# Patient Record
Sex: MALE | Race: WHITE | Employment: STUDENT | URBAN - METROPOLITAN AREA
[De-identification: names, ages, dates, MRNs, and addresses within clinical notes are randomized per-mention and may not be internally consistent; named-entity substitution may affect disease eponyms.]

---
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
## 2020-02-10 ENCOUNTER — HEALTH MAINTENANCE LETTER (OUTPATIENT)
Age: 20
End: 2020-02-10

## 2020-03-26 ENCOUNTER — MYC REFILL (OUTPATIENT)
Dept: FAMILY MEDICINE | Facility: CLINIC | Age: 20
End: 2020-03-26

## 2020-03-26 DIAGNOSIS — F41.1 GAD (GENERALIZED ANXIETY DISORDER): ICD-10-CM

## 2020-03-26 DIAGNOSIS — F32.1 MODERATE MAJOR DEPRESSION (H): ICD-10-CM

## 2020-03-27 NOTE — TELEPHONE ENCOUNTER
"Requested Prescriptions   Pending Prescriptions Disp Refills     FLUoxetine (PROZAC) 20 MG capsule 90 capsule 1     Sig: Take 1 capsule (20 mg) by mouth daily       SSRIs Protocol Failed - 3/26/2020  6:21 PM        Failed - PHQ-9 score less than 5 in past 6 months     Please review last PHQ-9 score.           Passed - Medication is active on med list        Passed - Patient is age 18 or older        Passed - Recent (6 mo) or future (30 days) visit within the authorizing provider's specialty     Patient had office visit in the last 6 months or has a visit in the next 30 days with authorizing provider or within the authorizing provider's specialty.  See \"Patient Info\" tab in inbasket, or \"Choose Columns\" in Meds & Orders section of the refill encounter.               Last Written Prescription Date:  11/29/19  Last Fill Quantity: 90,   # refills: 1  Last Office Visit: 11/29/19  Future Office visit:       Per review of medication, patient received 90 tablets on 11/29/19 with one refill. Patient should not need a refill until 5/29/20. Left vm to verify with patient if he needs refill and if he is taking medication correctly.    Micki Sosa RN   [facility]   "

## 2020-03-30 NOTE — TELEPHONE ENCOUNTER
PHQ-9 sent to patient through my chart to fill out.    Micki Sosa RN   [facility]

## 2020-04-07 ENCOUNTER — VIRTUAL VISIT (OUTPATIENT)
Dept: FAMILY MEDICINE | Facility: CLINIC | Age: 20
End: 2020-04-07
Payer: COMMERCIAL

## 2020-04-07 DIAGNOSIS — F32.1 MODERATE MAJOR DEPRESSION (H): ICD-10-CM

## 2020-04-07 DIAGNOSIS — F41.1 GAD (GENERALIZED ANXIETY DISORDER): Primary | ICD-10-CM

## 2020-04-07 PROCEDURE — 99214 OFFICE O/P EST MOD 30 MIN: CPT | Mod: 95 | Performed by: FAMILY MEDICINE

## 2020-04-07 NOTE — PROGRESS NOTES
"Subjective     Cole Pearce is a 19 year old male who is being evaluated via a billable telephone visit.      The patient has been notified of following:     \"This telephone visit will be conducted via a call between you and your physician/provider. We have found that certain health care needs can be provided without the need for a physical exam.  This service lets us provide the care you need with a short phone conversation.  If a prescription is necessary we can send it directly to your pharmacy.  If lab work is needed we can place an order for that and you can then stop by our lab to have the test done at a later time.    If during the course of the call the physician/provider feels a telephone visit is not appropriate, you will not be charged for this service.\"     Patient has given verbal consent for Telephone visit?  Yes    Cole Pearce complains of   Chief Complaint   Patient presents with     Depression     HUGO       ALLERGIES  Zithromax [azithromycin dihydrate]    Depression and Anxiety Follow-Up    How are you doing with your depression since your last visit? Improved somewhat    How are you doing with your anxiety since your last visit?  Improved some    Are you having other symptoms that might be associated with depression or anxiety? No    Have you had a significant life event? No     Do you have any concerns with your use of alcohol or other drugs? No    Social History     Tobacco Use     Smoking status: Never Smoker     Smokeless tobacco: Never Used   Substance Use Topics     Alcohol use: No     Drug use: No     PHQ 11/29/2019 11/29/2019 4/4/2020   PHQ-9 Total Score 19 19 17   Q9: Thoughts of better off dead/self-harm past 2 weeks Not at all Not at all Not at all     HUGO-7 SCORE 11/29/2019 11/29/2019   Total Score 17 17           Suicide Assessment Five-step Evaluation and Treatment (SAFE-T)      How many servings of fruits and vegetables do you eat daily?  2-3    On average, how many " sweetened beverages do you drink each day (Examples: soda, juice, sweet tea, etc.  Do NOT count diet or artificially sweetened beverages)?   0    How many days per week do you exercise enough to make your heart beat faster? 3 or less    How many minutes a day do you exercise enough to make your heart beat faster? 10 - 19    How many days per week do you miss taking your medication? 0         Patient Active Problem List   Diagnosis     Situational anxiety     Past Surgical History:   Procedure Laterality Date     CYSTECTOMY PILONIDAL N/A 7/6/2018    Procedure: CYSTECTOMY PILONIDAL;  EXCISION OF PILONIDAL CYST ;  Surgeon: Jasen Cesar MD;  Location: [facility]     GI SURGERY       LAPAROSCOPIC APPENDECTOMY N/A 11/1/2017    Procedure: LAPAROSCOPIC APPENDECTOMY;  LAPAROSCOPIC APPENDECTOMY;  Surgeon: Georgina Schafer MD;  Location:  OR       Social History     Tobacco Use     Smoking status: Never Smoker     Smokeless tobacco: Never Used   Substance Use Topics     Alcohol use: No     Family History   Problem Relation Age of Onset     Asthma Mother      Allergies Mother      Obesity Mother      Thyroid Disease Mother            Reviewed and updated as needed this visit by Provider         Review of Systems   ROS COMP: Constitutional, HEENT, cardiovascular, pulmonary, gi and gu systems are negative, except as otherwise noted.         Assessment/Plan:  1. HUGO (generalized anxiety disorder)  better  increase  - FLUoxetine (PROZAC) 20 MG capsule; Take 2 capsules (40 mg) by mouth daily  Dispense: 180 capsule; Refill: 1  Walk daily  2. Moderate major depression (H)  As above  - FLUoxetine (PROZAC) 20 MG capsule; Take 2 capsules (40 mg) by mouth daily  Dispense: 180 capsule; Refill: 1   Follow up in 1 month.     Phone call duration:  15 minutes    Amrik Armenta MD

## 2020-04-07 NOTE — TELEPHONE ENCOUNTER
Dr Armenta    Pt did the PHQ9 score was 17 better than the last 2 times,   Do you want to do a phone visit with him  Last OV was 11/29/19     Krissy Grubbs RN   [facility]

## 2020-04-29 ENCOUNTER — MYC REFILL (OUTPATIENT)
Dept: FAMILY MEDICINE | Facility: CLINIC | Age: 20
End: 2020-04-29

## 2020-04-29 DIAGNOSIS — F32.1 MODERATE MAJOR DEPRESSION (H): ICD-10-CM

## 2020-04-29 DIAGNOSIS — F41.1 GAD (GENERALIZED ANXIETY DISORDER): ICD-10-CM

## 2020-07-04 ENCOUNTER — OFFICE VISIT (OUTPATIENT)
Dept: URGENT CARE | Facility: URGENT CARE | Age: 20
End: 2020-07-04
Payer: COMMERCIAL

## 2020-07-04 VITALS
WEIGHT: 315 LBS | RESPIRATION RATE: 18 BRPM | TEMPERATURE: 98.6 F | OXYGEN SATURATION: 98 % | HEART RATE: 84 BPM | SYSTOLIC BLOOD PRESSURE: 138 MMHG | DIASTOLIC BLOOD PRESSURE: 90 MMHG | BODY MASS INDEX: 47.97 KG/M2

## 2020-07-04 DIAGNOSIS — H60.391 INFECTIVE OTITIS EXTERNA, RIGHT: Primary | ICD-10-CM

## 2020-07-04 PROCEDURE — 99213 OFFICE O/P EST LOW 20 MIN: CPT | Performed by: PHYSICIAN ASSISTANT

## 2020-07-04 RX ORDER — NEOMYCIN SULFATE, POLYMYXIN B SULFATE AND HYDROCORTISONE 10; 3.5; 1 MG/ML; MG/ML; [USP'U]/ML
3 SUSPENSION/ DROPS AURICULAR (OTIC) 4 TIMES DAILY
Qty: 5 ML | Refills: 0 | Status: SHIPPED | OUTPATIENT
Start: 2020-07-04 | End: 2020-07-11

## 2020-07-04 NOTE — PATIENT INSTRUCTIONS
Patient Education     External Ear Infection (Adult)    External otitis (also called  swimmer s ear ) is an infection in the ear canal. It is often caused by bacteria or fungus. It can occur a few days after water gets trapped in the ear canal (from swimming or bathing). It can also occur after cleaning too deeply in the ear canal with a cotton swab or other object. Sometimes, hair care products get into the ear canal and cause this problem.  Symptoms can include pain, fever, itching, redness, drainage, or swelling of the ear canal. Temporary hearing loss may also occur.  Home care    Do not try to clean the ear canal. This can push pus and bacteria deeper into the canal.    Use prescribed ear drops as directed. These help reduce swelling and fight the infection. If an ear wick was placed in the ear canal, apply drops right onto the end of the wick. The wick will draw the medicine into the ear canal even if it is swollen closed.    A cotton ball may be loosely placed in the outer ear to absorb any drainage.    You may use acetaminophen or ibuprofen to control pain, unless another medicine was prescribed. Note: If you have chronic liver or kidney disease or ever had a stomach ulcer or GI bleeding, talk to your healthcare provider before taking any of these medicines.    Do not allow water to get into your ear when bathing. Also, don't swim until the infection has cleared.  Prevention    Keep your ears dry. This helps lower the risk of infection. Dry your ears with a towel or hair dryer after getting wet. Also, use ear plugs when swimming.    Do not stick any objects in the ear to remove wax.    If you feel water trapped in your ear, use ear drops right away. You can get these drops over the counter at most drugstores. They work by removing water from the ear canal.  Follow-up care  Follow up with your healthcare provider in 1 week, or as advised.  When to seek medical advice  Call your healthcare provider right  away if any of these occur:    Ear pain becomes worse or doesn t improve after 3 days of treatment    Redness or swelling of the outer ear occurs or gets worse    Headache    Painful or stiff neck    Drowsiness or confusion    Fever of 100.4 F (38 C) or higher, or as directed by your healthcare provider    Seizure  Date Last Reviewed: 10/1/2017    6280-0710 [employer]. [address] [place] [address]. All rights reserved. This information is not intended as a substitute for professional medical care. Always follow your healthcare professional's instructions.

## 2020-07-04 NOTE — PROGRESS NOTES
"      SUBJECTIVE:  Cole Pearce presents to  today for evaluation of  Right ear pain and pain around right ear sometime \"shooting to my right jaw\" for 2 days duration. Of note, patient has been using ear buds.       ROS:     HEENT: Positive nasal congestion   RESP: Positive mild cough Despite cough, denies any severe SOB.  Denies any hx of asthma or RAD.   GI: Denies any N/V/D. No abdominal pain. Normal BM's  SKIN: Denies rash  NEURO:  Parent denies any lethargy.   URINARY: Reports good PO fluid intake and normal UOP.  Denies any dysuria or UTI sxs.       Past Medical History:   Diagnosis Date     Obese      Pilonidal cyst        Current Outpatient Medications   Medication     FLUoxetine (PROZAC) 20 MG capsule     No current facility-administered medications for this visit.        Allergies   Allergen Reactions     Zithromax [Azithromycin Dihydrate] Rash     Full body           OBJECTIVE:  BP (!) 138/90 (BP Location: Right arm, Patient Position: Chair, Cuff Size: Adult Large)   Pulse 84   Temp 98.6  F (37  C) (Tympanic)   Resp 18   Wt 143.1 kg (315 lb 8 oz)   SpO2 98%   BMI 47.97 kg/m          GENERAL:  Very pleasant, comfortable and generally well appearing.  Skin: Negative for any rashes and o/w normal. Well hydrated. Skin pink.  HEENT:   Conjunctiva without infection.  Sclera clear.  Nasal mucosa is congested   Oropharyngeal exam is normal: no lesions, erythema, adenopathy or exudate.    HEENT:   Conjunctiva without infection.  Sclera clear.  Left external canal is clear. eft TM is normal: no effusions, no erythema, and normal landmarks.  Right external canal is mild to moderately erythematous. Small amount of cerumen and purulent debris. Tragus is tender on manipulation. Right TM is normal in color: no effusions, no erythema, and normal landmarks. No tasha-auricular redness or swelling. No facial or neck swelling. No mastoid redness, heat, pain or swelling.   Nasal mucosa is normal.  Oropharyngeal " "exam is normal: no lesions, erythema, adenopathy or exudate.  NECK:  Supple. FROM. No lateral neck swelling. No adenopathy.  NEURO: Alert and oriented.  Normal speech and mentation.  CN II/XII grossly intact.  Gait within normal limits.        ASSESSMENT/PLAN:    (H60.669) Infective otitis externa, right  (primary encounter diagnosis)  Plan: neomycin-polymyxin-hydrocortisone (CORTISPORIN)        [id number] otic suspension    1. Abx gtts as per above   2. Follow-up with PCP if sxs change, worsen or fail to fully resolve with above tx.  3.  In addition to the above, OE  \"red flag\" signs and sxs are reviewed with pt both verbally and by way of printed educational material for home review.  Pt verbalizes understanding of and agrees to the above plan.                 "

## 2020-07-06 ENCOUNTER — MYC MEDICAL ADVICE (OUTPATIENT)
Dept: FAMILY MEDICINE | Facility: CLINIC | Age: 20
End: 2020-07-06

## 2020-07-06 ENCOUNTER — OFFICE VISIT (OUTPATIENT)
Dept: URGENT CARE | Facility: URGENT CARE | Age: 20
End: 2020-07-06
Payer: COMMERCIAL

## 2020-07-06 VITALS
TEMPERATURE: 98.4 F | HEART RATE: 93 BPM | OXYGEN SATURATION: 98 % | SYSTOLIC BLOOD PRESSURE: 132 MMHG | DIASTOLIC BLOOD PRESSURE: 80 MMHG

## 2020-07-06 DIAGNOSIS — H66.001 NON-RECURRENT ACUTE SUPPURATIVE OTITIS MEDIA OF RIGHT EAR WITHOUT SPONTANEOUS RUPTURE OF TYMPANIC MEMBRANE: Primary | ICD-10-CM

## 2020-07-06 PROCEDURE — 99213 OFFICE O/P EST LOW 20 MIN: CPT | Performed by: FAMILY MEDICINE

## 2020-07-06 RX ORDER — AMOXICILLIN 875 MG
875 TABLET ORAL 2 TIMES DAILY
Qty: 14 TABLET | Refills: 0 | Status: SHIPPED | OUTPATIENT
Start: 2020-07-06 | End: 2020-07-13

## 2020-07-06 ASSESSMENT — ENCOUNTER SYMPTOMS
COUGH: 0
SHORTNESS OF BREATH: 0
SORE THROAT: 0

## 2020-07-07 NOTE — PROGRESS NOTES
Subjective     Cole Pearce is a 20 year old male who presents to clinic today for the following health issues:    HPI     20-year-old male presents urgent care for worsening ear pains.  Was recently seen and diagnosed with otitis externa, on Corticosporin drops.  The last day, has been having worsening right ear pain and increased sensitivity to loud sounds including his dog barking.  No drainage.  Temperature at home 99.8 Fahrenheit.  Last Tylenol used 1 hour prior to being seen in urgent care.       Reviewed and updated as needed this visit by Provider         Review of Systems   HENT: Negative for sore throat.    Respiratory: Negative for cough and shortness of breath.             Objective    /80   Pulse 93   Temp 98.4  F (36.9  C) (Tympanic)   SpO2 98%   There is no height or weight on file to calculate BMI.  Physical Exam  Constitutional:       Appearance: He is not ill-appearing.   HENT:      Left Ear: Tympanic membrane normal.      Ears:      Comments: No pain with insertion of the otoscope into the right ear canal.  Scant cerumen.  Tympanic membrane is bulging, erythematous with clear fluid.  There is no significant pre-or post auricular adenopathy, no mastoid erythema or edema.        Assessment & Plan       Provider wore face shield, surgical mask, gown and gloves throughout the entire encounter.          1. Non-recurrent acute suppurative otitis media of right ear without spontaneous rupture of tympanic membrane  Acute problem.  Start amoxicillin.  Gave precautions to monitor for symptoms including any swelling or redness just behind the right ear, advised to be seen emergently at that time.  Okay to continue Corticosporin drops  - amoxicillin (AMOXIL) 875 MG tablet; Take 1 tablet (875 mg) by mouth 2 times daily for 7 days  Dispense: 14 tablet; Refill: 0      Return in about 3 days (around 7/9/2020) for If symptoms do not improve or gets worse..    Geo Rodriguez MD  [facility]

## 2020-07-07 NOTE — PATIENT INSTRUCTIONS
Patient Education     Middle Ear Infection (Adult)  You have an infection of the middle ear, the space behind the eardrum. This is also called acute otitis media (AOM). Sometimes it is caused by the common cold. This is because congestion can block the internal passage (eustachian tube) that drains fluid from the middle ear. When the middle ear fills with fluid, bacteria can grow there and cause an infection. Oral antibiotics are used to treat this illness, not ear drops. Symptoms usually start to improve within 1 to 2 days of treatment.    Home care  The following are general care guidelines:    Finish all of the antibiotic medicine given, even though you may feel better after the first few days.    You may use over-the-counter medicine, such as acetaminophen or ibuprofen, to control pain and fever, unless something else was prescribed. If you have chronic liver or kidney disease or have ever had a stomach ulcer or gastrointestinal bleeding, talk with your healthcare provider before using these medicines. Do not give aspirin to anyone under 18 years of age who has a fever. It may cause severe illness or death.  Follow-up care  Follow up with your healthcare provider, or as advised, in 2 weeks if all symptoms have not gotten better, or if hearing doesn't go back to normal within 1 month.  When to seek medical advice  Call your healthcare provider right away if any of these occur:    Ear pain gets worse or does not improve after 3 days of treatment    Unusual drowsiness or confusion    Neck pain, stiff neck, or headache    Fluid or blood draining from the ear canal    Fever of 100.4 F (38 C) or as advised     Seizure  Date Last Reviewed: 6/1/2016 2000-2019 [employer]. [address] [place] [address], [place] [address]. All rights reserved. This information is not intended as a substitute for professional medical care. Always follow your healthcare professional's instructions.

## 2020-11-16 ENCOUNTER — HEALTH MAINTENANCE LETTER (OUTPATIENT)
Age: 20
End: 2020-11-16

## 2021-04-03 ENCOUNTER — HEALTH MAINTENANCE LETTER (OUTPATIENT)
Age: 21
End: 2021-04-03

## 2021-04-08 ENCOUNTER — IMMUNIZATION (OUTPATIENT)
Dept: NURSING | Facility: CLINIC | Age: 21
End: 2021-04-08
Payer: COMMERCIAL

## 2021-04-08 PROCEDURE — 0001A PR COVID VAC PFIZER DIL RECON 30 MCG/0.3 ML IM: CPT

## 2021-04-08 PROCEDURE — 91300 PR COVID VAC PFIZER DIL RECON 30 MCG/0.3 ML IM: CPT

## 2021-04-14 ENCOUNTER — OFFICE VISIT (OUTPATIENT)
Dept: URGENT CARE | Facility: URGENT CARE | Age: 21
End: 2021-04-14
Payer: COMMERCIAL

## 2021-04-14 VITALS
WEIGHT: 315 LBS | SYSTOLIC BLOOD PRESSURE: 145 MMHG | OXYGEN SATURATION: 100 % | HEART RATE: 76 BPM | TEMPERATURE: 98.4 F | BODY MASS INDEX: 49.26 KG/M2 | DIASTOLIC BLOOD PRESSURE: 83 MMHG

## 2021-04-14 DIAGNOSIS — H61.23 BILATERAL IMPACTED CERUMEN: ICD-10-CM

## 2021-04-14 DIAGNOSIS — M26.609 TMJ (TEMPOROMANDIBULAR JOINT SYNDROME): Primary | ICD-10-CM

## 2021-04-14 PROCEDURE — 69209 REMOVE IMPACTED EAR WAX UNI: CPT | Mod: 50 | Performed by: NURSE PRACTITIONER

## 2021-04-14 PROCEDURE — 99213 OFFICE O/P EST LOW 20 MIN: CPT | Mod: 25 | Performed by: NURSE PRACTITIONER

## 2021-04-14 RX ORDER — CYCLOBENZAPRINE HCL 10 MG
TABLET ORAL
Qty: 21 TABLET | Refills: 0 | Status: SHIPPED | OUTPATIENT
Start: 2021-04-14

## 2021-04-14 ASSESSMENT — ENCOUNTER SYMPTOMS
FACIAL SWELLING: 0
CHILLS: 0
ACTIVITY CHANGE: 0
RHINORRHEA: 0
FACIAL ASYMMETRY: 0
SINUS PAIN: 0
WHEEZING: 0
DIAPHORESIS: 0
CHEST TIGHTNESS: 0
SINUS PRESSURE: 0
SHORTNESS OF BREATH: 0
SORE THROAT: 0
HEADACHES: 0
WEAKNESS: 0
LIGHT-HEADEDNESS: 0
APPETITE CHANGE: 0
PARESTHESIAS: 1
SLEEP DISTURBANCE: 1
NAUSEA: 0
DIZZINESS: 0
FATIGUE: 0
PALPITATIONS: 0
COUGH: 0
NUMBNESS: 0
ABDOMINAL PAIN: 0
VOMITING: 0
FEVER: 0
COLOR CHANGE: 0
TROUBLE SWALLOWING: 0

## 2021-04-15 NOTE — PATIENT INSTRUCTIONS
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  Apply moist heat- washcloth under hot water.  Eat tart foots as tolerated- lemon drops  Drink plenty of water.  Massage the glad as possible.  Follow up with primary care provider if you develop a fever, redness, swelling, cannot open mouth, exudate in your cheek, or symptoms worsen in a couple days.    Patient Education     Salivary Gland Infection  Salivary glands make saliva. Saliva is mostly water. It also has minerals and proteins that help break down food and keep the mouth and teeth healthy. There are 3 pairs of salivary glands:     Parotid glands. In front of the ear.    Submandibular glands. Below the jaw.    Sublingual glands. Below the tongue.  A salivary gland can become infected by germs (bacteria). Things that make this more likely include dehydration and taking medicines that affect saliva flow. Infection is also more likely when the tube (duct) that carries saliva from the gland to the mouth is blocked. It may be blocked by a salivary gland stone. This is a collection of minerals that forms in the salivary gland.   Symptoms of infection include fever, severe pain in the gland, and redness and swelling over the gland. It may hurt to open your mouth. Symptoms may be worse when saliva flow is stimulated, such as by the smell of food.    Antibiotics are used to treat the infection. Draining the infection with a simple surgery may be needed. If you have a salivary gland stone, a procedure may be done to remove it.   Home care    Take antibiotics as directed until they are finished. Do this even if you start to feel better after only a few days.    Unless another medicine was prescribed, take over-the-counter medicines to help ease pain. These include acetaminophen or  ibuprofen.    Moist heat can also help ease swelling and pain. Wet a cloth with warm water and put it over the sore gland for 10 to 15 minutes several times a day.    Gently massage the gland a few times a day.     Suck on lemon or other tart hard candies to make saliva flow.  To help prevent stones and infections:     Drink 6 to 8 glasses of fluid per day (such as water, tea, and clear soup) to keep well-hydrated.    If you smoke, ask your healthcare provider for help to quit. Smoking makes salivary gland stones more likely.    Keep good dental hygiene. Brush and floss your teeth daily. See your dentist for regular cleanings.  Follow-up care  Follow up with your healthcare provider, or as advised.    When to call your healthcare provider   Call your healthcare provider if any of the following occur:     Fever over 100.4 F (38 C) after 2 days of taking antibiotics    Symptoms that get worse or don't get better in a few days  Call 911  Call 911 if you have trouble breathing or swallowing.   [employer] last reviewed this educational content on 3/1/2020    1314-7933 [employer]. All rights reserved. This information is not intended as a substitute for professional medical care. Always follow your healthcare professional's instructions.               Patient Education     TMJ Syndrome  The temporomandibular joint (TMJ) is the joint that connects your lower jaw to your skull. You can feel it in front of your ears when you open and close your mouth. TMJ disorders cause chronic or recurrent pain and problems in the jaw joint and the muscles that control jaw movement. Symptoms of TMJ disorders are usually relieved with minor treatments. But symptoms may come back, especially in times of stress.   Causes  There is no widely agreed-on cause of TMJ disorders. They have been linked to injury, arthritis, tooth and jaw alignment, chronic fatigue syndrome, and fibromyalgia. A definite connection has not been shown to  these, though.   Symptoms    Pain in the face, jaw, or neck    Pain with jaw movement or chewing    Locking or catching sensation of the jaw    Clicking, popping, or grinding sounds with movement of the TMJ    Headache    Ear pain    Home care  Modest treatments are a good first step toward relieving symptoms. Try these methods.     Reduce stress on your jaw by not eating crunchy or hard-to-chew foods. Don t eat hard or sticky candies. Soft foods and liquids are easier on the jaw.    Protect your jaw while yawning. If you need to yawn, put your fist under your chin to prevent your mouth from opening too wide.    To help relieve pain, put hot or cold packs on the area that hurts. Try both hot and cold to find out which works best for you. To make a cold pack, put ice cubes in a plastic bag that seals at the top. Wrap the bag in a clean, thin towel or cloth. Never put ice or an ice pack directly on the skin. If you use hot packs (small towels soaked in hot water), be careful not to burn yourself.    You may take acetaminophen or ibuprofen for pain, unless you were given a different pain medicine. (Note: If you have chronic liver or kidney disease or have ever had a stomach ulcer or gastrointestinal bleeding, talk with your healthcare provider before using these medicines. Also talk to your provider if you are taking medicine to prevent blood clots.) Don t give aspirin to a child younger than age 19 unless directed by the child s provider. Taking aspirin can put a child at risk for Reye syndrome. This is a rare but very serious disorder that most often affects the brain and the liver.  Reducing stress  If stress seems to be contributing to your symptoms, try to find the sources of stress in your life. These aren t always obvious. Common stressors include:     Everyday hassles. These include things such as traffic jams, missed appointments, or car trouble.    Major life changes. These can be good, such as a new baby or  job promotion. Or they can be bad, such as losing a job or losing a loved one.    Overload. This is the feeling that you have too many responsibilities and can't take care of everything at once.    Helplessness. This is when you feel like your problems are more than you can solve.  When possible, try to make changes in your sources of stress. See if you can avoid hassles, limit the amount of change in your life at one time, and take breaks when you feel overloaded.   Many stressful situations can't be avoided. So learning how to manage stress is very important. To make everyday stress more manageable:     Getting regular exercise.    Eat nutritious, balanced meals.    Get enough rest.    Try relaxation and breathing exercises, visualization, biofeedback, or meditation.    Take some time out to clear your mind.  For more information, talk with your healthcare provider.  Follow-up care  Follow up with your healthcare provider, or as advised. More testing and other treatment may be needed. If changes to your lifestyle do not improve your symptoms, talk with your healthcare provider about other treatments. These include bite guards for help with teeth grinding, stress management methods, and more. If stress is an important factor and does not respond to the above lifestyle changes, talk with your healthcare provider about a referral for stress management.   If X-rays were done, they will be reviewed by a specialist. You will be told the results and if they affect your treatment.   Call 911  Call 911 if you have any of these:     Trouble breathing or swallowing    Wheezing    Confusion    Extreme drowsiness or trouble awakening    Fainting or loss of consciousness    Fast heart rate  When to seek medical advice  Call your healthcare provider right away if you have any of these:     Swollen or red face    Jaw or face pain that gets worse    Neck, mouth, tooth, or throat pain that gets worse    Fever of 100.4 F (38 C) or  higher, or as directed by your healthcare provider  Franco last reviewed this educational content on 8/1/2020 2000-2021 [employer]. All rights reserved. This information is not intended as a substitute for professional medical care. Always follow your healthcare professional's instructions.

## 2021-04-15 NOTE — PROGRESS NOTES
Chief Complaint   Patient presents with     Urgent Care     Ear Problem     R ear pain goes down to jaw, feels clogged every once in a while, pain comes and goes      SUBJECTIVE:  Cole Pearce is a 20 year old male who presents with right cheek pain for 2 days. He thinks he might have an ear infection. He gets ear lavage done occasionally for wax build up. The pain is pretty severe, intermittent, achey, stabbing, travels from preauricular area to his submandibular area. He denies tenderness, exudate, dental issues, fever. Ibuprofen did not help.    Past Medical History:   Diagnosis Date     Obese      Pilonidal cyst      FLUoxetine (PROZAC) 20 MG capsule, Take 2 capsules (40 mg) by mouth daily    No current facility-administered medications on file prior to visit.     Social History     Tobacco Use     Smoking status: Never Smoker     Smokeless tobacco: Never Used   Substance Use Topics     Alcohol use: No     Allergies   Allergen Reactions     Zithromax [Azithromycin Dihydrate] Rash     Full body     Review of Systems   Constitutional: Negative for activity change, appetite change, chills, diaphoresis, fatigue and fever.   HENT: Positive for ear pain. Negative for congestion, dental problem, ear discharge, facial swelling, mouth sores, nosebleeds, rhinorrhea, sinus pressure, sinus pain, sore throat, tinnitus and trouble swallowing.    Respiratory: Negative for cough, chest tightness, shortness of breath and wheezing.    Cardiovascular: Negative for palpitations.   Gastrointestinal: Negative for abdominal pain, nausea and vomiting.   Skin: Negative for color change, pallor and rash.   Allergic/Immunologic: Negative for environmental allergies and food allergies.   Neurological: Positive for paresthesias. Negative for dizziness, facial asymmetry, weakness, light-headedness, numbness and headaches.   Psychiatric/Behavioral: Positive for sleep disturbance.     OBJECTIVE:  BP (!) 145/83   Pulse 76   Temp 98.4  F  (36.9  C)   Wt 147 kg (324 lb)   SpO2 100%   BMI 49.26 kg/m       Physical Exam  Vitals signs reviewed.   Constitutional:       General: He is not in acute distress.     Appearance: Normal appearance. He is not ill-appearing, toxic-appearing or diaphoretic.   HENT:      Head: Normocephalic and atraumatic.      Ears:      Comments: Bilateral cerumen impaction with light orange wax irrigated by MA with warm water and hydrogen peroxide. Left canal slightly erythematous following. Tympanic membranes normal.     Nose: Nose normal.      Mouth/Throat:      Mouth: Mucous membranes are moist.      Pharynx: Oropharynx is clear. No oropharyngeal exudate or posterior oropharyngeal erythema.      Comments: Right cheek area without tenderness, edema, erythema, warmth. It is a deeper neuropathic pain.  Eyes:      Extraocular Movements: Extraocular movements intact.      Conjunctiva/sclera: Conjunctivae normal.      Pupils: Pupils are equal, round, and reactive to light.   Neck:      Musculoskeletal: Normal range of motion and neck supple.   Cardiovascular:      Rate and Rhythm: Normal rate.   Pulmonary:      Effort: Pulmonary effort is normal.   Musculoskeletal: Normal range of motion.   Skin:     General: Skin is warm and dry.      Findings: No erythema or rash.   Neurological:      General: No focal deficit present.      Mental Status: He is alert and oriented to person, place, and time.   Psychiatric:         Mood and Affect: Mood normal.         Behavior: Behavior normal.       ASSESSMENT:    ICD-10-CM    1. TMJ (temporomandibular joint syndrome)  M26.609 cyclobenzaprine (FLEXERIL) 10 MG tablet   2. Bilateral impacted cerumen  H61.23 HC REMOVAL IMPACTED CERUMEN IRRIGATION/LVG UNILAT     PLAN:   Patient Instructions   Likely TMJ given neuropathic pain, will start with flexeril three times daily as tolerated  Differential is parotitis salivary gland infection, but no redness, warmth, swelling yet  Sialolithiasis- stone in the  salivary gland.  Apply moist heat- washcloth under hot water.  Eat tart foots as tolerated- lemon drops  Drink plenty of water.  Massage the glad as possible.  Follow up with primary care provider if you develop a fever, redness, swelling, cannot open mouth, exudate in your cheek, or symptoms worsen in a couple days.    Patient Education     Salivary Gland Infection  Salivary glands make saliva. Saliva is mostly water. It also has minerals and proteins that help break down food and keep the mouth and teeth healthy. There are 3 pairs of salivary glands:     Parotid glands. In front of the ear.    Submandibular glands. Below the jaw.    Sublingual glands. Below the tongue.  A salivary gland can become infected by germs (bacteria). Things that make this more likely include dehydration and taking medicines that affect saliva flow. Infection is also more likely when the tube (duct) that carries saliva from the gland to the mouth is blocked. It may be blocked by a salivary gland stone. This is a collection of minerals that forms in the salivary gland.   Symptoms of infection include fever, severe pain in the gland, and redness and swelling over the gland. It may hurt to open your mouth. Symptoms may be worse when saliva flow is stimulated, such as by the smell of food.    Antibiotics are used to treat the infection. Draining the infection with a simple surgery may be needed. If you have a salivary gland stone, a procedure may be done to remove it.   Home care    Take antibiotics as directed until they are finished. Do this even if you start to feel better after only a few days.    Unless another medicine was prescribed, take over-the-counter medicines to help ease pain. These include acetaminophen or ibuprofen.    Moist heat can also help ease swelling and pain. Wet a cloth with warm water and put it over the sore gland for 10 to 15 minutes several times a day.    Gently massage the gland a few times a day.     Suck on  lemon or other tart hard candies to make saliva flow.  To help prevent stones and infections:     Drink 6 to 8 glasses of fluid per day (such as water, tea, and clear soup) to keep well-hydrated.    If you smoke, ask your healthcare provider for help to quit. Smoking makes salivary gland stones more likely.    Keep good dental hygiene. Brush and floss your teeth daily. See your dentist for regular cleanings.  Follow-up care  Follow up with your healthcare provider, or as advised.    When to call your healthcare provider   Call your healthcare provider if any of the following occur:     Fever over 100.4 F (38 C) after 2 days of taking antibiotics    Symptoms that get worse or don't get better in a few days  Call 911  Call 911 if you have trouble breathing or swallowing.   [employer] last reviewed this educational content on 3/1/2020    2612-8555 [employer]. All rights reserved. This information is not intended as a substitute for professional medical care. Always follow your healthcare professional's instructions.               Patient Education     TMJ Syndrome  The temporomandibular joint (TMJ) is the joint that connects your lower jaw to your skull. You can feel it in front of your ears when you open and close your mouth. TMJ disorders cause chronic or recurrent pain and problems in the jaw joint and the muscles that control jaw movement. Symptoms of TMJ disorders are usually relieved with minor treatments. But symptoms may come back, especially in times of stress.   Causes  There is no widely agreed-on cause of TMJ disorders. They have been linked to injury, arthritis, tooth and jaw alignment, chronic fatigue syndrome, and fibromyalgia. A definite connection has not been shown to these, though.   Symptoms    Pain in the face, jaw, or neck    Pain with jaw movement or chewing    Locking or catching sensation of the jaw    Clicking, popping, or grinding sounds with movement of the  TMJ    Headache    Ear pain    Home care  Modest treatments are a good first step toward relieving symptoms. Try these methods.     Reduce stress on your jaw by not eating crunchy or hard-to-chew foods. Don t eat hard or sticky candies. Soft foods and liquids are easier on the jaw.    Protect your jaw while yawning. If you need to yawn, put your fist under your chin to prevent your mouth from opening too wide.    To help relieve pain, put hot or cold packs on the area that hurts. Try both hot and cold to find out which works best for you. To make a cold pack, put ice cubes in a plastic bag that seals at the top. Wrap the bag in a clean, thin towel or cloth. Never put ice or an ice pack directly on the skin. If you use hot packs (small towels soaked in hot water), be careful not to burn yourself.    You may take acetaminophen or ibuprofen for pain, unless you were given a different pain medicine. (Note: If you have chronic liver or kidney disease or have ever had a stomach ulcer or gastrointestinal bleeding, talk with your healthcare provider before using these medicines. Also talk to your provider if you are taking medicine to prevent blood clots.) Don t give aspirin to a child younger than age 19 unless directed by the child s provider. Taking aspirin can put a child at risk for Reye syndrome. This is a rare but very serious disorder that most often affects the brain and the liver.  Reducing stress  If stress seems to be contributing to your symptoms, try to find the sources of stress in your life. These aren t always obvious. Common stressors include:     Everyday hassles. These include things such as traffic jams, missed appointments, or car trouble.    Major life changes. These can be good, such as a new baby or job promotion. Or they can be bad, such as losing a job or losing a loved one.    Overload. This is the feeling that you have too many responsibilities and can't take care of everything at  once.    Helplessness. This is when you feel like your problems are more than you can solve.  When possible, try to make changes in your sources of stress. See if you can avoid hassles, limit the amount of change in your life at one time, and take breaks when you feel overloaded.   Many stressful situations can't be avoided. So learning how to manage stress is very important. To make everyday stress more manageable:     Getting regular exercise.    Eat nutritious, balanced meals.    Get enough rest.    Try relaxation and breathing exercises, visualization, biofeedback, or meditation.    Take some time out to clear your mind.  For more information, talk with your healthcare provider.  Follow-up care  Follow up with your healthcare provider, or as advised. More testing and other treatment may be needed. If changes to your lifestyle do not improve your symptoms, talk with your healthcare provider about other treatments. These include bite guards for help with teeth grinding, stress management methods, and more. If stress is an important factor and does not respond to the above lifestyle changes, talk with your healthcare provider about a referral for stress management.   If X-rays were done, they will be reviewed by a specialist. You will be told the results and if they affect your treatment.   Call 911  Call 911 if you have any of these:     Trouble breathing or swallowing    Wheezing    Confusion    Extreme drowsiness or trouble awakening    Fainting or loss of consciousness    Fast heart rate  When to seek medical advice  Call your healthcare provider right away if you have any of these:     Swollen or red face    Jaw or face pain that gets worse    Neck, mouth, tooth, or throat pain that gets worse    Fever of 100.4 F (38 C) or higher, or as directed by your healthcare provider  [employer] last reviewed this educational content on 8/1/2020 2000-2021 [employer]. All rights reserved. This information  is not intended as a substitute for professional medical care. Always follow your healthcare professional's instructions.             Follow up with primary care provider with any problems, questions or concerns or if symptoms worsen or fail to improve. Patient agreed to plan and verbalized understanding.    ANNA Booth-[facility] [place]

## 2021-04-26 ENCOUNTER — VIRTUAL VISIT (OUTPATIENT)
Dept: FAMILY MEDICINE | Facility: CLINIC | Age: 21
End: 2021-04-26
Payer: COMMERCIAL

## 2021-04-26 DIAGNOSIS — F41.1 GAD (GENERALIZED ANXIETY DISORDER): Primary | ICD-10-CM

## 2021-04-26 PROCEDURE — 99213 OFFICE O/P EST LOW 20 MIN: CPT | Mod: 95 | Performed by: FAMILY MEDICINE

## 2021-04-26 RX ORDER — BUSPIRONE HYDROCHLORIDE 5 MG/1
5 TABLET ORAL 3 TIMES DAILY
Qty: 90 TABLET | Refills: 0 | Status: SHIPPED | OUTPATIENT
Start: 2021-04-26 | End: 2022-02-07

## 2021-04-26 NOTE — PROGRESS NOTES
Cole is a 20 year old who is being evaluated via a billable video visit.      How would you like to obtain your AVS? MyChart  If the video visit is dropped, the invitation should be resent by: Text to cell phone: [phone]  Will anyone else be joining your video visit? No      Video Start Time: 300    Assessment & Plan     HUGO (generalized anxiety disorder)  start  - busPIRone (BUSPAR) 5 MG tablet; Take 1 tablet (5 mg) by mouth 3 times daily  Follow up by phone in 1 week if not improving. Then Follow up in 1 month.            Regular exercise  See Patient Instructions    No follow-ups on file.    Amrik Armenta MD  [facility]    Cornell Galvan is a 20 year old who presents for the following health issues     HPI     Anxiety Follow-Up    How are you doing with your anxiety since your last visit? Worsened Pt discontinued his medication and wanted to discuss starting something different    Are you having other symptoms that might be associated with anxiety? Yes:  Has increased due to stopping his medication    Have you had a significant life event? OTHER: School concerns     Are you feeling depressed? Yes:  Not on medication currently    Do you have any concerns with your use of alcohol or other drugs? No    Social History     Tobacco Use     Smoking status: Never Smoker     Smokeless tobacco: Never Used   Substance Use Topics     Alcohol use: No     Drug use: No     HUGO-7 SCORE 11/29/2019 11/29/2019   Total Score 17 17     PHQ 11/29/2019 11/29/2019 4/4/2020   PHQ-9 Total Score 19 19 17   Q9: Thoughts of better off dead/self-harm past 2 weeks Not at all Not at all Not at all     Last PHQ-9 4/4/2020   1.  Little interest or pleasure in doing things 3   2.  Feeling down, depressed, or hopeless 1   3.  Trouble falling or staying asleep, or sleeping too much 3   4.  Feeling tired or having little energy 3   5.  Poor appetite or overeating 3   6.  Feeling bad about yourself 1   7.   Trouble concentrating 3   8.  Moving slowly or restless 0   Q9: Thoughts of better off dead/self-harm past 2 weeks 0   PHQ-9 Total Score 17   Difficulty at work, home, or with people -     HUGO-7  11/29/2019   1. Feeling nervous, anxious, or on edge 3   2. Not being able to stop or control worrying 3   3. Worrying too much about different things 3   4. Trouble relaxing 2   5. Being so restless that it is hard to sit still 2   6. Becoming easily annoyed or irritable 1   7. Feeling afraid, as if something awful might happen 3   HUGO-7 Total Score 17   If you checked any problems, how difficult have they made it for you to do your work, take care of things at home, or get along with other people? Somewhat difficult         How many servings of fruits and vegetables do you eat daily?  2-3    On average, how many sweetened beverages do you drink each day (Examples: soda, juice, sweet tea, etc.  Do NOT count diet or artificially sweetened beverages)?   0    How many days per week do you exercise enough to make your heart beat faster? 3 or less    How many minutes a day do you exercise enough to make your heart beat faster? 9 or less    How many days per week do you miss taking your medication? Pt stopped medication as wanted to try something else        Review of Systems   Constitutional, HEENT, cardiovascular, pulmonary, gi and gu systems are negative, except as otherwise noted.      Objective           Vitals:  No vitals were obtained today due to virtual visit.    Physical Exam   GENERAL: Healthy, alert and no distress  RESP: No audible wheeze, cough, or visible cyanosis.  No visible retractions or increased work of breathing.    NEURO: Cranial nerves grossly intact.  Mentation and speech appropriate for age.  PSYCH: Mentation appears normal, affect normal/bright, judgement and insight intact, normal speech      Admission on 07/06/2018, Discharged on 07/06/2018   Component Date Value Ref Range Status     Copath Report  "07/06/2018    Final                    Value:Patient Name: REZA BOWIE  MR#: [id number]  Specimen #: [id number]  Collected: 7/6/2018  Received: 7/6/2018  Reported: 7/9/2018 14:16  Ordering Phy(s): RADHA CORDOBA    For improved result formatting, select 'View Enhanced Report Format' under   Linked Documents section.    SPECIMEN(S):  Pilonidal cyst    FINAL DIAGNOSIS:  \"Pilonidal cyst\", excision  - Pilonidal cyst    Electronically signed out by:    Cherelle Escudero M.D.    CLINICAL HISTORY:  18-year-old, pilonidal cyst    GROSS:  The specimen is received in formalin, labeled with the patient's name and   date of birth, and designated  \"pilonidal cyst\". It consists of a 9.0 x 4.0 cm lightly pigmented skin   ellipse, excised to a depth of 5.0 cm.  The cutaneous surface contains a 0.5 cm pink-tan ulcer.  Sectioning   reveals an 8.0 x 2.5 x 2.0 cm red-tan cyst  tract, grossly consistent with a pilonidal cyst tract.  The tract does not   appear to involve any of the  resection margins.  A representative section is submitted                           in one cassette.   (Dictated by: DIAMOND Xie([facility]) 7/6/2018 10:33 AM)    MICROSCOPIC:  Sections show an abscess cavity composed of abundant neutrophils, plasma   cells, small lymphocytes, with small  vessels. Rare hair shafts are noted, some of which are engulfed by foreign   body giant cells. Hemosiderin  macrophages are noted focally, consistent with prior hemorrhage. The   overlying epithelium is unremarkable.    CPT Codes:  A: 69900-EQ6    TESTING LAB LOCATION:  [address]  [address]  [phone]    COLLECTION SITE:  Client: [facility]  Location: [facility] (S)                   Video-Visit Details    Type of service:  Video Visit    Video End Time:315    Originating Location (pt. Location): Home    Distant Location (provider location):  [facility]     Platform " used for Video Visit: Mesha

## 2021-04-30 ENCOUNTER — IMMUNIZATION (OUTPATIENT)
Dept: NURSING | Facility: CLINIC | Age: 21
End: 2021-04-30
Attending: INTERNAL MEDICINE
Payer: COMMERCIAL

## 2021-04-30 PROCEDURE — 0002A PR COVID VAC PFIZER DIL RECON 30 MCG/0.3 ML IM: CPT

## 2021-04-30 PROCEDURE — 91300 PR COVID VAC PFIZER DIL RECON 30 MCG/0.3 ML IM: CPT

## 2021-09-18 ENCOUNTER — HEALTH MAINTENANCE LETTER (OUTPATIENT)
Age: 21
End: 2021-09-18

## 2022-02-07 ENCOUNTER — VIRTUAL VISIT (OUTPATIENT)
Dept: FAMILY MEDICINE | Facility: CLINIC | Age: 22
End: 2022-02-07
Payer: COMMERCIAL

## 2022-02-07 DIAGNOSIS — F41.1 GAD (GENERALIZED ANXIETY DISORDER): ICD-10-CM

## 2022-02-07 DIAGNOSIS — F32.1 MODERATE MAJOR DEPRESSION (H): ICD-10-CM

## 2022-02-07 PROCEDURE — 99214 OFFICE O/P EST MOD 30 MIN: CPT | Mod: 95 | Performed by: FAMILY MEDICINE

## 2022-02-07 ASSESSMENT — ANXIETY QUESTIONNAIRES
1. FEELING NERVOUS, ANXIOUS, OR ON EDGE: NEARLY EVERY DAY
IF YOU CHECKED OFF ANY PROBLEMS ON THIS QUESTIONNAIRE, HOW DIFFICULT HAVE THESE PROBLEMS MADE IT FOR YOU TO DO YOUR WORK, TAKE CARE OF THINGS AT HOME, OR GET ALONG WITH OTHER PEOPLE: EXTREMELY DIFFICULT
2. NOT BEING ABLE TO STOP OR CONTROL WORRYING: NEARLY EVERY DAY
3. WORRYING TOO MUCH ABOUT DIFFERENT THINGS: NEARLY EVERY DAY
7. FEELING AFRAID AS IF SOMETHING AWFUL MIGHT HAPPEN: NEARLY EVERY DAY
6. BECOMING EASILY ANNOYED OR IRRITABLE: NEARLY EVERY DAY
GAD7 TOTAL SCORE: 19
5. BEING SO RESTLESS THAT IT IS HARD TO SIT STILL: MORE THAN HALF THE DAYS

## 2022-02-07 ASSESSMENT — PATIENT HEALTH QUESTIONNAIRE - PHQ9
SUM OF ALL RESPONSES TO PHQ QUESTIONS 1-9: 22
5. POOR APPETITE OR OVEREATING: MORE THAN HALF THE DAYS

## 2022-02-07 NOTE — PROGRESS NOTES
Cole is a 21 year old who is being evaluated via a billable video visit.      How would you like to obtain your AVS? MyChart  If the video visit is dropped, the invitation should be resent by: Text to cell phone: [phone]  Will anyone else be joining your video visit? No      Video Start Time: 216    Assessment & Plan     HUGO (generalized anxiety disorder)  Wore, needs to exerciser get back on   - Fluoxetine (PROZAC) 20 MG capsule; Take 2 capsules (40 mg) by mouth daily  See therapist   Follow up in 1 month.   Moderate major depression (H)  Made no harm contract    has had thoughts of him being better off dead, no plans and no chemical use   agrees to get help ASAP if worse  Follow up in 1 month.   - Fluoxetine (PROZAC) 20 MG capsule; Take 2 capsules (40 mg) by mouth daily             Depression Screening Follow Up    PHQ 2/7/2022   PHQ-9 Total Score 22   Q9: Thoughts of better off dead/self-harm past 2 weeks Several days     Last PHQ-9 2/7/2022   1.  Little interest or pleasure in doing things 2   2.  Feeling down, depressed, or hopeless 3   3.  Trouble falling or staying asleep, or sleeping too much 3   4.  Feeling tired or having little energy 3   5.  Poor appetite or overeating 2   6.  Feeling bad about yourself 3   7.  Trouble concentrating 3   8.  Moving slowly or restless 2   Q9: Thoughts of better off dead/self-harm past 2 weeks 1   PHQ-9 Total Score 22   Difficulty at work, home, or with people Extremely dIfficult               Follow Up      Follow Up Actions Taken  Patient to follow up with PCP.  Clinic staff to schedule appointment if able.    Discussed the following ways the patient can remain in a safe environment:  remove things I could use to hurt myself: done, and be around others  Regular exercise  See Patient Instructions    No follow-ups on file.    Amrik Armenta MD  [facility]    Cornell Galvan is a 21 year old who presents for the following [facility]  issues     HPI     Depression and Anxiety Follow-Up    How are you doing with your depression since your last visit? Worsened     How are you doing with your anxiety since your last visit?  Worsened     Are you having other symptoms that might be associated with depression or anxiety? No    Have you had a significant life event? OTHER: College and arguments with Fiance'     Do you have any concerns with your use of alcohol or other drugs? No    Social History     Tobacco Use     Smoking status: Never Smoker     Smokeless tobacco: Never Used   Substance Use Topics     Alcohol use: No     Drug use: No     PHQ 11/29/2019 11/29/2019 4/4/2020   PHQ-9 Total Score 19 19 17   Q9: Thoughts of better off dead/self-harm past 2 weeks Not at all Not at all Not at all     HUGO-7 SCORE 11/29/2019 11/29/2019   Total Score 17 17     Last PHQ-9 2/7/2022   1.  Little interest or pleasure in doing things 2   2.  Feeling down, depressed, or hopeless 3   3.  Trouble falling or staying asleep, or sleeping too much 3   4.  Feeling tired or having little energy 3   5.  Poor appetite or overeating 2   6.  Feeling bad about yourself 3   7.  Trouble concentrating 3   8.  Moving slowly or restless 2   Q9: Thoughts of better off dead/self-harm past 2 weeks 1   PHQ-9 Total Score 22   Difficulty at work, home, or with people Extremely dIfficult     HUGO-7  2/7/2022   1. Feeling nervous, anxious, or on edge 3   2. Not being able to stop or control worrying 3   3. Worrying too much about different things 3   4. Trouble relaxing 2   5. Being so restless that it is hard to sit still 2   6. Becoming easily annoyed or irritable 3   7. Feeling afraid, as if something awful might happen 3   HUGO-7 Total Score 19   If you checked any problems, how difficult have they made it for you to do your work, take care of things at home, or get along with other people? Extremely difficult       Suicide Assessment Five-step Evaluation and Treatment (SAFE-T)      How  "many servings of fruits and vegetables do you eat daily?  0-1    On average, how many sweetened beverages do you drink each day (Examples: soda, juice, sweet tea, etc.  Do NOT count diet or artificially sweetened beverages)?   0    How many days per week do you exercise enough to make your heart beat faster? 3 or less    How many minutes a day do you exercise enough to make your heart beat faster? 9 or less  How many days per week do you miss taking your medication? Not on medications currently    What makes it hard for you to take your medications?  Kept forgetting to take them        Review of Systems   Constitutional, HEENT, cardiovascular, pulmonary, gi and gu systems are negative, except as otherwise noted.      Objective           Vitals:  No vitals were obtained today due to virtual visit.    Physical Exam   GENERAL: Healthy, alert and no distress  EYES: Eyes grossly normal to inspection.  No discharge or erythema, or obvious scleral/conjunctival abnormalities.  RESP: No audible wheeze, cough, or visible cyanosis.  No visible retractions or increased work of breathing.    SKIN: Visible skin clear. No significant rash, abnormal pigmentation or lesions.  NEURO: Cranial nerves grossly intact.  Mentation and speech appropriate for age.  PSYCH: Mentation appears normal, affect normal/bright, judgement and insight intact, normal speech and appearance well-groomed.    Admission on 07/06/2018, Discharged on 07/06/2018   Component Date Value Ref Range Status     Copath Report 07/06/2018    Final                    Value:Patient Name: REZA BOWIE  MR#: [id number]  Specimen #: [id number]  Collected: 7/6/2018  Received: 7/6/2018  Reported: 7/9/2018 14:16  Ordering Phy(s): RADHA CORDOBA    For improved result formatting, select 'View Enhanced Report Format' under   Linked Documents section.    SPECIMEN(S):  Pilonidal cyst    FINAL DIAGNOSIS:  \"Pilonidal cyst\", excision  - Pilonidal cyst    Electronically " "signed out by:    Cherelle Escudero M.D.    CLINICAL HISTORY:  18-year-old, pilonidal cyst    GROSS:  The specimen is received in formalin, labeled with the patient's name and   date of birth, and designated  \"pilonidal cyst\". It consists of a 9.0 x 4.0 cm lightly pigmented skin   ellipse, excised to a depth of 5.0 cm.  The cutaneous surface contains a 0.5 cm pink-tan ulcer.  Sectioning   reveals an 8.0 x 2.5 x 2.0 cm red-tan cyst  tract, grossly consistent with a pilonidal cyst tract.  The tract does not   appear to involve any of the  resection margins.  A representative section is submitted                           in one cassette.   (Dictated by: DIAMOND Xie([facility]) 7/6/2018 10:33 AM)    MICROSCOPIC:  Sections show an abscess cavity composed of abundant neutrophils, plasma   cells, small lymphocytes, with small  vessels. Rare hair shafts are noted, some of which are engulfed by foreign   body giant cells. Hemosiderin  macrophages are noted focally, consistent with prior hemorrhage. The   overlying epithelium is unremarkable.    CPT Codes:  A: 90120-NG9    TESTING LAB LOCATION:  [address]  [id number] [phone]    COLLECTION SITE:  [facility]  [facility]                   Video-Visit Details    Type of service:  Video Visit    Video End Time:2:28 PM    Originating Location (pt. Location): Home    Distant Location (provider location):  [facility]     Platform used for Video Visit: Mesha"

## 2022-02-08 ASSESSMENT — ANXIETY QUESTIONNAIRES: GAD7 TOTAL SCORE: 19

## 2022-04-30 ENCOUNTER — HEALTH MAINTENANCE LETTER (OUTPATIENT)
Age: 22
End: 2022-04-30

## 2022-10-31 ENCOUNTER — VIRTUAL VISIT (OUTPATIENT)
Dept: BEHAVIORAL HEALTH | Facility: CLINIC | Age: 22
End: 2022-10-31
Payer: COMMERCIAL

## 2022-10-31 DIAGNOSIS — F41.1 GAD (GENERALIZED ANXIETY DISORDER): Primary | ICD-10-CM

## 2022-10-31 DIAGNOSIS — F33.1 MODERATE RECURRENT MAJOR DEPRESSION (H): ICD-10-CM

## 2022-10-31 PROCEDURE — 90834 PSYTX W PT 45 MINUTES: CPT | Mod: 95

## 2022-10-31 ASSESSMENT — ANXIETY QUESTIONNAIRES
6. BECOMING EASILY ANNOYED OR IRRITABLE: NEARLY EVERY DAY
3. WORRYING TOO MUCH ABOUT DIFFERENT THINGS: SEVERAL DAYS
1. FEELING NERVOUS, ANXIOUS, OR ON EDGE: NEARLY EVERY DAY
7. FEELING AFRAID AS IF SOMETHING AWFUL MIGHT HAPPEN: MORE THAN HALF THE DAYS
8. IF YOU CHECKED OFF ANY PROBLEMS, HOW DIFFICULT HAVE THESE MADE IT FOR YOU TO DO YOUR WORK, TAKE CARE OF THINGS AT HOME, OR GET ALONG WITH OTHER PEOPLE?: SOMEWHAT DIFFICULT
GAD7 TOTAL SCORE: 14
IF YOU CHECKED OFF ANY PROBLEMS ON THIS QUESTIONNAIRE, HOW DIFFICULT HAVE THESE PROBLEMS MADE IT FOR YOU TO DO YOUR WORK, TAKE CARE OF THINGS AT HOME, OR GET ALONG WITH OTHER PEOPLE: SOMEWHAT DIFFICULT
7. FEELING AFRAID AS IF SOMETHING AWFUL MIGHT HAPPEN: MORE THAN HALF THE DAYS
4. TROUBLE RELAXING: NEARLY EVERY DAY
GAD7 TOTAL SCORE: 14
2. NOT BEING ABLE TO STOP OR CONTROL WORRYING: SEVERAL DAYS
GAD7 TOTAL SCORE: 14
5. BEING SO RESTLESS THAT IT IS HARD TO SIT STILL: SEVERAL DAYS

## 2022-10-31 ASSESSMENT — PATIENT HEALTH QUESTIONNAIRE - PHQ9
10. IF YOU CHECKED OFF ANY PROBLEMS, HOW DIFFICULT HAVE THESE PROBLEMS MADE IT FOR YOU TO DO YOUR WORK, TAKE CARE OF THINGS AT HOME, OR GET ALONG WITH OTHER PEOPLE: VERY DIFFICULT
SUM OF ALL RESPONSES TO PHQ QUESTIONS 1-9: 25
SUM OF ALL RESPONSES TO PHQ QUESTIONS 1-9: 25

## 2022-10-31 NOTE — PROGRESS NOTES
[facility] Primary Care: Integrated Behavioral Health  2022      Behavioral Health Clinician Progress Note    Patient Name: Cole Pearce           Service Type:  Individual      Service Location:   MyChart / Email (patient reached)     Session Start Time: 12:16  Session End Time: 1:12 PM       Session Length: 53 - 60      Attendees: Patient     Service Modality:  Video Visit:      Provider verified identity through the following two step process.  Patient provided:  Patient photo and Patient     Telemedicine Visit: The patient's condition can be safely assessed and treated via synchronous audio and visual telemedicine encounter.      Reason for Telemedicine Visit: Patient has requested telehealth visit    Originating Site (Patient Location): Pt's car (it was parked).     Distant Site (Provider Location): [facility]    Consent:  The patient/guardian has verbally consented to: the potential risks and benefits of telemedicine (video visit) versus in person care; bill my insurance or make self-payment for services provided; and responsibility for payment of non-covered services.     Patient would like the video invitation sent by:  My Chart    Mode of Communication:  Video Conference via [employer]    Distant Location (Provider):  On-site     Of note, due to patient's Internet difficulty, half way through the visit, it switched to phone call.     As the provider I attest to compliance with applicable laws and regulations related to telemedicine.    Visit Activities (Refresh list every visit): NEW and [facility] Only    Diagnostic Assessment Date: TBD- not yet completed   Treatment Plan Review Date: n/a   See Flowsheets for today's PHQ-9 and HUGO-7 results  Previous PHQ-9:   PHQ-9 SCORE 2020 2022 10/31/2022   PHQ-9 Total Score Geovanna 17 (Moderately severe depression) - 25 (Severe depression)   PHQ-9 Total Score 17 22 25     Previous HUGO-7:   HUGO-7 SCORE  11/29/2019 2/7/2022 10/31/2022   Total Score - - 14 (moderate anxiety)   Total Score 17 19 14       RAGHAV LEVEL:  No flowsheet data found.    DATA  Extended Session (60+ minutes): No  Interactive Complexity: No  Crisis: No  [facility] Patient: No    Treatment Objective(s) Addressed in This Session:  Target Behavior(s): address depression and anxiety symptoms in context of multiple stressors including: work, school, and relationship discord.     Depressed Mood: Increase interest, engagement, and pleasure in doing things  Decrease frequency and intensity of feeling down, depressed, hopeless  Feel less tired and more energy during the day   Improve diet, appetite, mindful eating, and / or meal planning  Identify negative self-talk and behaviors: challenge core beliefs, myths, and actions  Improve concentration, focus, and mindfulness in daily activities   Decrease thoughts that you'd be better off dead or of suicide / self-harm  Anxiety: will experience a reduction in anxiety, will develop more effective coping skills to manage anxiety symptoms, will develop healthy cognitive patterns and beliefs and will increase ability to function adaptively    Current Stressors / Issues:    [facility] met with pt virtually on this date. [facility] provided information and education on role. Pt reports that he decided to seek mental health support at this time as he reports that his mental health has deteriorated over the last several months. 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 Pt reports that his fiance is pregnant and their baby is due at the end of December.     Pt endorses exacerbated depression symptoms and states that this is partially stemming from relationship discord with his fiance. Pt expressed that he and his fiance are under a lot of stress preparing for the birth of their daughter. Pt states that they do not current live together and live an hour away  from each other. Pt processed through relationship concerns including lack of intimacy, communication, and emotional connection. [facility] provided validation and emotional support.     Pt endorses that his depression symptoms are impacting his work and academic performance. Pt endorses lack of motivation, difficulty concentrating, and lack of interest in activities that he once enjoyed. Pt also endorses that he starting to become more resentful and frustrated towards his fiance which is causing him distress. Pt also endorses mood lability and states that he is hopeful his mood with stabilize. Pt states that he was previously taking psychiatric medication, but stopped due to running out or refills and being afraid to come into the clinic to get his blood drawn per PCP's request. Pt reports that he is open to re-starting psychiatric medication at this time. Pt states that he has had symptoms of depression for most of his life, and that his symptoms were exacerbated in college when pt reports that he was diagnosed with anxiety as well. Pt does endorse a familial history of depression and states that both of his parents are prescribed medication.    In regards to safety concerns, pt denies HI/SIB. Pt does endorse fleeting, passive SI that is intensifying, but not active. Pt states that this is his primary reason for seeking mental health support at this time. Pt endorses recent intrusive thoughts of putting a gun in his mouth, which pt states is scary to him. Pt states that his suicidal ideation has intensified over the last 3-4 months. Pt became tearful when discussing this stating that he does not want to inflict pain on himself or his family. Pt denies having intent to act on these thoughts. [facility] recommended that safety plan be created at this time. Pt declined stating that he feels that he does not need a safety plan as he has no intention of acting on these thoughts or taking his own life. Pt states that he feels  supported by his parents and states that he lives with them. C and pt discussed being open with his parents and letting them know if he feels he cannot keep himself safe or if his suicidal thoughts become active. Pt agreed, pt states that if his parents suggested he go to the hospital if he was unable to keep himself safe that he would agree to be assessed. Pt denies any history of suicide attempts of psychiatric inpatient admissions. Pt reports that his father does own firearms, but pt reports that he does not know where the guns are and that his father is the only person that has access to them. Pt denies current safety concerns and reports that he is able to keep himself safe at this time.     Pt is future-oriented and states that he wants to get better. Pt's biggest protective factors are his parents and his daughter. Pt states that he wants to be present in his daughter's life and a good father. 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 C and pt discussed self-care and making time for himself. [facility] and pt also discussed different coping skills. Pt states that self-care is an area that he would like to see improvement. Pt states that he will try to engage in physical movement more frequently and start utilizing his creative outlets. Pt states that he enjoys painting miniature figurines and reports that he recently brought his paints back into the house. Pt states that he is also looking forward to spending time with friends in the upcoming days.     [facility] was able to assist pt in scheduling urgent appointment with PCP on 11/03 to discuss restarting psychiatric medications. Pt is also agreeable to meeting with writer on a weekly basis.       Progress on Treatment Objective(s) / Homework:  New Objective established this session - PREPARATION (Decided to change - considering how); Intervened by negotiating a change plan and determining options / strategies for behavior  "change, identifying triggers, exploring social supports, and working towards setting a date to begin behavior change    Motivational Interviewing    MI Intervention: Expressed Empathy/Understanding, Supported Autonomy, Collaboration, Evocation, Permission to raise concern or advise, Open-ended questions, Reflections: simple and complex and Change talk (evoked)     Change Talk Expressed by the Patient: Desire to change Reasons to change Need to change    Provider Response to Change Talk: E - Evoked more info from patient about behavior change, A - Affirmed patient's thoughts, decisions, or attempts at behavior change, R - Reflected patient's change talk and S - Summarized patient's change talk statements      Care Plan review completed: Yes    Medication Review:  Pt reports that he last saw his PCP in February 2022. Pt states that he did not refill his medications due to PCP requesting lab work. Pt endorses a fear of pain and what patient described as \"medical phobia\".     Medication Compliance:  No    Changes in Health Issues:   None reported    Chemical Use Review:   Substance Use: Chemical use reviewed, no active concerns identified  Pt reports that he does use delta products to assisting in going to sleep at night.      Tobacco Use: No current tobacco use.      Assessment: Current Emotional / Mental Status (status of significant symptoms):  Risk status (Self / Other harm or suicidal ideation)  Patient has had a history of suicidal ideation: pt endorses suicidal ideation that pt endorses as passive  Patient denies current fears or concerns for personal safety.  Patient reports the following current or recent suicidal ideation or behaviors: pt endorses fleeting, passive suicidal ideation. Pt states that he has started to have thoughts of putting a gun in his mouth which scares him. Pt does not have intent to act on these thoughts. .  Patient denies current or recent homicidal ideation or behaviors.  Patient denies " current or recent self injurious behavior or ideation.  Patient denies other safety concerns.  A safety and risk management plan has not been developed at this time, however patient was encouraged to call [place] [id number] should there be a change in any of these risk factors. [facility] did recommend creating a safety plan at this time, but pt declined. Pt declined stating that he feels that he does not need a safety plan as he has no intention of acting on these thoughts or taking his own life. Pt states that he feels supported by his parents and states that he lives with them. [facility] and pt discussed being open with his parents and letting them know if he feels he cannot keep himself safe or if his suicidal thoughts become active. Pt agreed, pt states that if his parents suggested he go to the hospital if he was unable to keep himself safe that he would agree to be assessed. Pt denies any history of suicide attempts of psychiatric inpatient admissions. Pt reports that his father does own firearms, but pt reports that he does not know where the guns are and that his father is the only person that has access to them. Pt denies current safety concerns.     Risk is mitigated by pt's willingness to seek mental health support. Pt is agreeable to meeting with [facility] on weekly basis and next appointment has been scheduled for 11/07. [facility] also assisted pt in scheduling urgent appointment with PCP to discuss restarting psychiatric medication which pt is agreeable to.  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 Protective factors include his parents and his daughter who is due at the end of December 2022. Coping skills were also discussed to include increasing physical movement and starting to paint miniature figurines again which is a hobby that pt really enjoys.     Appearance:   Appropriate   Eye Contact:   Good   Psychomotor Behavior: Normal   Attitude:   Cooperative   Interested  Orientation:   All  Speech   Rate / Production: Normal    Volume:  Normal   Mood:    Anxious   Affect:    Blunted   Thought Content:  Clear   Thought Form:  Coherent  Logical   Insight:    Good     Diagnoses:  1. HUGO (generalized anxiety disorder)    2. Moderate recurrent major depression (H)    Provisional, Hx diagnoses. DA has not yet been completed.     Collateral Reports Completed:  Communicated with: pt's PCP- Dr. Armenta to arrange for patient to be seen as soon as possible for visit to restart psychiatric medications.     Plan: (Homework, other):  Patient was given information about behavioral services and encouraged to schedule a follow up appointment with the clinic [facility] in 1 week. [facility] assisted pt in doing so. [facility] also facilitated pt being seen urgently by PCP to address re-starting psychiatric medications. Pt will be seen by Dr. Armenta this Thursday, 22 at 2:00 PM.  [facility] and pt also discussed  He was also given information about mental health symptoms and treatment options .  CD Recommendations: No indications of CD issues.       ADEOLA Rivera, [facility]  Behavioral Health Clinician  [facility], [address], [address], [address], [place], [place], [address]  [phone]

## 2022-11-03 ENCOUNTER — VIRTUAL VISIT (OUTPATIENT)
Dept: FAMILY MEDICINE | Facility: CLINIC | Age: 22
End: 2022-11-03
Payer: COMMERCIAL

## 2022-11-03 DIAGNOSIS — F33.1 MAJOR DEPRESSIVE DISORDER, RECURRENT EPISODE, MODERATE (H): ICD-10-CM

## 2022-11-03 DIAGNOSIS — F41.1 GAD (GENERALIZED ANXIETY DISORDER): Primary | ICD-10-CM

## 2022-11-03 PROCEDURE — 99214 OFFICE O/P EST MOD 30 MIN: CPT | Mod: 95 | Performed by: FAMILY MEDICINE

## 2022-11-03 ASSESSMENT — PATIENT HEALTH QUESTIONNAIRE - PHQ9
SUM OF ALL RESPONSES TO PHQ QUESTIONS 1-9: 23
SUM OF ALL RESPONSES TO PHQ QUESTIONS 1-9: 23
10. IF YOU CHECKED OFF ANY PROBLEMS, HOW DIFFICULT HAVE THESE PROBLEMS MADE IT FOR YOU TO DO YOUR WORK, TAKE CARE OF THINGS AT HOME, OR GET ALONG WITH OTHER PEOPLE: VERY DIFFICULT

## 2022-11-03 NOTE — PROGRESS NOTES
Cole is a 22 year old who is being evaluated via a billable video visit.      How would you like to obtain your AVS? MyChart  If the video visit is dropped, the invitation should be resent by: Text to cell phone: [phone]  Will anyone else be joining your video visit? No          Assessment & Plan     HUGO (generalized anxiety disorder)  Deepthi rafter therapy/ walking  - FLUoxetine (PROZAC) 20 MG capsule; Take 1 capsule (20 mg) by mouth daily  - Comprehensive metabolic panel (BMP + Alb, Alk Phos, ALT, AST, Total. Bili, TP); Future  - TSH with free T4 reflex; Future  - Lipid panel reflex to direct LDL Fasting; Future  Follow up in 3 weeks.   Major depressive disorder, recurrent episode, moderate (H)  Better now   no harm contract   see therapist  Follow up in 2 to 3 weeks.                Depression Screening Follow Up    PHQ 11/3/2022   PHQ-9 Total Score 23   Q9: Thoughts of better off dead/self-harm past 2 weeks More than half the days   F/U: Thoughts of suicide or self-harm Yes   F/U: Self harm-plan No   F/U: Self-harm action No   F/U: Safety concerns No     Last PHQ-9 11/3/2022   1.  Little interest or pleasure in doing things 3   2.  Feeling down, depressed, or hopeless 3   3.  Trouble falling or staying asleep, or sleeping too much 2   4.  Feeling tired or having little energy 3   5.  Poor appetite or overeating 2   6.  Feeling bad about yourself 3   7.  Trouble concentrating 3   8.  Moving slowly or restless 2   Q9: Thoughts of better off dead/self-harm past 2 weeks 2   PHQ-9 Total Score 23   Difficulty at work, home, or with people -   In the past two weeks have you had thoughts of suicide or self harm? Yes   Do you have concerns about your personal safety or the safety of others? No   In the past 2 weeks have you thought about a plan or had intention to harm yourself? No   In the past 2 weeks have you acted on these thoughts in any way? No                Follow Up      Follow Up Actions Taken  Consult  with [facility], monitored patient safety and notified provider.  Patient to follow up with PCP.  Clinic staff to schedule appointment if able.  Scheduled appointment with [facility]    Discussed the following ways the patient can remain in a safe environment:  remove access to firearms: woill do and be around others  Regular exercise  See Patient Instructions    Return in about 3 weeks (around 11/24/2022) for with me, in person, using a video visit.    Amrik Armenta MD  [facility]ADELINE Galvan is a 22 year old, presenting for the following health issues:  No chief complaint on file.      HPI     Depression and Anxiety Follow-Up    How are you doing with your depression since your last visit? Improved now, wants to go back on prozac    How are you doing with your anxiety since your last visit?  No change    Are you having other symptoms that might be associated with depression or anxiety? Yes:  sadness/ irritable    Have you had a significant life event? Relationship Concerns     Do you have any concerns with your use of alcohol or other drugs? No    Social History     Tobacco Use     Smoking status: Never     Smokeless tobacco: Never   Substance Use Topics     Alcohol use: No     Drug use: No     PHQ 2/7/2022 10/31/2022 11/3/2022   PHQ-9 Total Score 22 25 23   Q9: Thoughts of better off dead/self-harm past 2 weeks Several days Nearly every day More than half the days   F/U: Thoughts of suicide or self-harm - Yes Yes   F/U: Self harm-plan - Yes No   F/U: Self-harm action - No No   F/U: Safety concerns - No No     HUGO-7 SCORE 11/29/2019 2/7/2022 10/31/2022   Total Score - - 14 (moderate anxiety)   Total Score 17 19 14     Last PHQ-9 11/3/2022   1.  Little interest or pleasure in doing things 3   2.  Feeling down, depressed, or hopeless 3   3.  Trouble falling or staying asleep, or sleeping too much 2   4.  Feeling tired or having little energy 3   5.  Poor appetite or overeating 2   6.   Feeling bad about yourself 3   7.  Trouble concentrating 3   8.  Moving slowly or restless 2   Q9: Thoughts of better off dead/self-harm past 2 weeks 2   PHQ-9 Total Score 23   Difficulty at work, home, or with people -   In the past two weeks have you had thoughts of suicide or self harm? Yes   Do you have concerns about your personal safety or the safety of others? No   In the past 2 weeks have you thought about a plan or had intention to harm yourself? No   In the past 2 weeks have you acted on these thoughts in any way? No     HUGO-7  10/31/2022   1. Feeling nervous, anxious, or on edge 3   2. Not being able to stop or control worrying 1   3. Worrying too much about different things 1   4. Trouble relaxing 3   5. Being so restless that it is hard to sit still 1   6. Becoming easily annoyed or irritable 3   7. Feeling afraid, as if something awful might happen 2   HUGO-7 Total Score 14   If you checked any problems, how difficult have they made it for you to do your work, take care of things at home, or get along with other people? Somewhat difficult             Follow Up Actions Taken         Review of Systems   Constitutional, HEENT, cardiovascular, pulmonary, gi and gu systems are negative, except as otherwise noted.      Objective           Vitals:  No vitals were obtained today due to virtual visit.    Physical Exam   GENERAL: alert and no distress  EYES: Eyes grossly normal to inspection.  No discharge or erythema, or obvious scleral/conjunctival abnormalities.  RESP: No audible wheeze, cough, or visible cyanosis.  No visible retractions or increased work of breathing.    SKIN: Visible skin clear. No significant rash, abnormal pigmentation or lesions.  NEURO: Cranial nerves grossly intact.  Mentation and speech appropriate for age.  PSYCH: Mentation appears normal, affect normal/bright, judgement and insight intact, normal speech and appearance well-groomed.    Admission on 07/06/2018, Discharged on  "07/06/2018   Component Date Value Ref Range Status     Copath Report 07/06/2018    Final                    Value:Patient Name: REZA BOWIE  MR#: [id number]  Specimen #: [id number]  Collected: 7/6/2018  Received: 7/6/2018  Reported: 7/9/2018 14:16  Ordering Phy(s): RADHA CORDOBA    For improved result formatting, select 'View Enhanced Report Format' under   Linked Documents section.    SPECIMEN(S):  Pilonidal cyst    FINAL DIAGNOSIS:  \"Pilonidal cyst\", excision  - Pilonidal cyst    Electronically signed out by:    Cherelle Escudero M.D.    CLINICAL HISTORY:  18-year-old, pilonidal cyst    GROSS:  The specimen is received in formalin, labeled with the patient's name and   date of birth, and designated  \"pilonidal cyst\". It consists of a 9.0 x 4.0 cm lightly pigmented skin   ellipse, excised to a depth of 5.0 cm.  The cutaneous surface contains a 0.5 cm pink-tan ulcer.  Sectioning   reveals an 8.0 x 2.5 x 2.0 cm red-tan cyst  tract, grossly consistent with a pilonidal cyst tract.  The tract does not   appear to involve any of the  resection margins.  A representative section is submitted                           in one cassette.   (Dictated by: DIAMOND Xie([facility]) 7/6/2018 10:33 AM)    MICROSCOPIC:  Sections show an abscess cavity composed of abundant neutrophils, plasma   cells, small lymphocytes, with small  vessels. Rare hair shafts are noted, some of which are engulfed by foreign   body giant cells. Hemosiderin  macrophages are noted focally, consistent with prior hemorrhage. The   overlying epithelium is unremarkable.    CPT Codes:  A: 70971-UF9    TESTING LAB LOCATION:  [address]  [address] [phone]    COLLECTION SITE:  Client: [facility]  Location: [facility] (S)                   Video-Visit Details    Video Start Time: 200    Type of service:  Video Visit    Video End Time:220    Originating Location (pt. Location): " Home        Distant Location (provider location):  On-site    Platform used for Video Visit: [employer]    Answers for HPI/ROS submitted by the patient on 11/3/2022  If you checked off any problems, how difficult have these problems made it for you to do your work, take care of things at home, or get along with other people?: Very difficult  PHQ9 TOTAL SCORE: 23  Depression/Anxiety: Depression & Anxiety  Status since last visit:: bad  Anxiety since last: : medium  Other associated symptoms of depression:: Yes  Other associated symotome: : No  Significant life event: : relationship concerns, job concerns, housing concerns  Anxious:: No  Current substance use:: No  How many servings of fruits and vegetables do you eat daily?: 0-1  On average, how many sweetened beverages do you drink each day (Examples: soda, juice, sweet tea, etc.  Do NOT count diet or artificially sweetened beverages)?: 0  How many minutes a day do you exercise enough to make your heart beat faster?: 9 or less  How many days a week do you exercise enough to make your heart beat faster?: 3 or less  How many days per week do you miss taking your medication?: 7  What makes it hard for you to take your medication every day?: remembering to take

## 2022-11-07 ENCOUNTER — VIRTUAL VISIT (OUTPATIENT)
Dept: BEHAVIORAL HEALTH | Facility: CLINIC | Age: 22
End: 2022-11-07
Payer: COMMERCIAL

## 2022-11-07 DIAGNOSIS — F33.1 MODERATE RECURRENT MAJOR DEPRESSION (H): ICD-10-CM

## 2022-11-07 DIAGNOSIS — F41.1 GAD (GENERALIZED ANXIETY DISORDER): Primary | ICD-10-CM

## 2022-11-07 PROCEDURE — 90834 PSYTX W PT 45 MINUTES: CPT | Mod: 95

## 2022-11-07 ASSESSMENT — COLUMBIA-SUICIDE SEVERITY RATING SCALE - C-SSRS
TOTAL  NUMBER OF ABORTED OR SELF INTERRUPTED ATTEMPTS LIFETIME: NO
1. IN THE PAST MONTH, HAVE YOU WISHED YOU WERE DEAD OR WISHED YOU COULD GO TO SLEEP AND NOT WAKE UP?: YES
5. HAVE YOU STARTED TO WORK OUT OR WORKED OUT THE DETAILS OF HOW TO KILL YOURSELF? DO YOU INTEND TO CARRY OUT THIS PLAN?: NO
TOTAL  NUMBER OF INTERRUPTED ATTEMPTS LIFETIME: NO
2. HAVE YOU ACTUALLY HAD ANY THOUGHTS OF KILLING YOURSELF?: NO
2. HAVE YOU ACTUALLY HAD ANY THOUGHTS OF KILLING YOURSELF?: NO
ATTEMPT LIFETIME: NO
5. HAVE YOU STARTED TO WORK OUT OR WORKED OUT THE DETAILS OF HOW TO KILL YOURSELF? DO YOU INTEND TO CARRY OUT THIS PLAN?: NO
4. HAVE YOU HAD THESE THOUGHTS AND HAD SOME INTENTION OF ACTING ON THEM?: NO
3. HAVE YOU BEEN THINKING ABOUT HOW YOU MIGHT KILL YOURSELF?: NO
1. HAVE YOU WISHED YOU WERE DEAD OR WISHED YOU COULD GO TO SLEEP AND NOT WAKE UP?: YES
6. HAVE YOU EVER DONE ANYTHING, STARTED TO DO ANYTHING, OR PREPARED TO DO ANYTHING TO END YOUR LIFE?: NO

## 2022-11-07 ASSESSMENT — PATIENT HEALTH QUESTIONNAIRE - PHQ9
10. IF YOU CHECKED OFF ANY PROBLEMS, HOW DIFFICULT HAVE THESE PROBLEMS MADE IT FOR YOU TO DO YOUR WORK, TAKE CARE OF THINGS AT HOME, OR GET ALONG WITH OTHER PEOPLE: EXTREMELY DIFFICULT
SUM OF ALL RESPONSES TO PHQ QUESTIONS 1-9: 24
SUM OF ALL RESPONSES TO PHQ QUESTIONS 1-9: 24

## 2022-11-07 NOTE — PROGRESS NOTES
[facility] Primary Care: Integrated Behavioral Health  2022      Behavioral Health Clinician Progress Note    Patient Name: Cole Pearce           Service Type:  Individual      Service Location:   MyChart / Email (patient reached)     Session Start Time: 11:00 AM  Session End Time: 11:52 AM      Session Length: 38 - 52      Attendees: Patient     Service Modality:  Video Visit:      Provider verified identity through the following two step process.  Patient provided:  Patient photo and Patient     Telemedicine Visit: The patient's condition can be safely assessed and treated via synchronous audio and visual telemedicine encounter.      Reason for Telemedicine Visit: Patient has requested telehealth visit    Originating Site (Patient Location): Pt's car (it was parked).     Distant Site (Provider Location): [facility]    Consent:  The patient/guardian has verbally consented to: the potential risks and benefits of telemedicine (video visit) versus in person care; bill my insurance or make self-payment for services provided; and responsibility for payment of non-covered services.     Patient would like the video invitation sent by:  My Chart    Mode of Communication:  Video Conference via [employer]    Distant Location (Provider):  On-site     Of note, due to patient's Internet difficulty, half way through the visit, it switched to phone call.     As the provider I attest to compliance with applicable laws and regulations related to telemedicine.    Visit Activities (Refresh list every visit): [facility] Only    Diagnostic Assessment Date: TBD- not yet completed   Treatment Plan Review Date: n/a   See Flowsheets for today's PHQ-9 and HUGO-7 results  Previous PHQ-9:   PHQ-9 SCORE 10/31/2022 11/3/2022 2022   PHQ-9 Total Score [facility] 25 (Severe depression) 23 (Severe depression) 24 (Severe depression)   PHQ-9 Total Score 25 23 24     Previous HUGO-7:    HUGO-7 SCORE 11/29/2019 2/7/2022 10/31/2022   Total Score - - 14 (moderate anxiety)   Total Score 17 19 14     [place] Suicide Severity Rating Scale (Lifetime/Recent)  [place] Suicide Severity Rating (Lifetime/Recent) 11/7/2022   1. Wish to be Dead (Lifetime) 1   1. Wish to be Dead (Past 1 Month) 1   2. Non-Specific Active Suicidal Thoughts (Lifetime) 0   2. Non-Specific Active Suicidal Thoughts (Past 1 Month) 0   3. Active Suicidal Ideation with any Methods (Not Plan) Without Intent to Act (Lifetime) 0   4. Active Suicidal Ideation with Some Intent to Act, Without Specific Plan (Lifetime) 0   5. Active Suicidal Ideation with Specific Plan and Intent (Lifetime) 0   5. Active Suicidal Ideation with Specific Plan and Intent (Past 1 Month) 0   Most Severe Ideation Rating (Lifetime) 4   Most Severe Ideation Rating (Past 1 Month) 4   Frequency (Lifetime) 5   Frequency (Past 1 Month) 4   Duration (Lifetime) 1   Duration (Past 1 Month) 1   Controllability (Lifetime) 3   Controllability (Past 1 Month) 2   Deterrents (Lifetime) 1   Deterrents (Past 1 Month) 1   Actual Attempt (Lifetime) 0   Has subject engaged in non-suicidal self-injurious behavior? (Lifetime) 0   Interrupted Attempts (Lifetime) 0   Aborted or Self-Interrupted Attempt (Lifetime) 0   Preparatory Acts or Behavior (Lifetime) 0   Calculated C-SSRS Risk Score (Lifetime/Recent) Low Risk       RAGHAV LEVEL:  No flowsheet data found.    DATA  Extended Session (60+ minutes): No  Interactive Complexity: No  Crisis: No  State mental health facility Patient: No    Treatment Objective(s) Addressed in This Session:  Target Behavior(s): address depression and anxiety symptoms in context of multiple stressors including: work, school, and relationship discord.     Depressed Mood: Increase interest, engagement, and pleasure in doing things  Decrease frequency and intensity of feeling down, depressed, hopeless  Feel less tired and more energy during the day   Improve diet, appetite, mindful eating,  and / or meal planning  Identify negative self-talk and behaviors: challenge core beliefs, myths, and actions  Improve concentration, focus, and mindfulness in daily activities   Decrease thoughts that you'd be better off dead or of suicide / self-harm  Anxiety: will experience a reduction in anxiety, will develop more effective coping skills to manage anxiety symptoms, will develop healthy cognitive patterns and beliefs and will increase ability to function adaptively    Current Stressors / Issues:    Update:  [facility] met with pt on this date and consulted with psychiatrist. Since last appointment, pt reports that he is feeling better, but still endorses some occasional irritability and mood lability. Pt is future-oriented and states that he reached out to his  and states that he is close to graduating which pt reports is a motivator for him. Pt states that his grades are fair, but not as high as he would like. Pt endorses that he is having difficulty completing the required readings and focusing. [facility] and pt discussed developing a routine in regards to completing readings for classes.     Pt also discussed progress with his significant other and their relationship. Pt endorses that he is being included in  care with his significant other which he appreciates. Pt states that he had conversation with his partner around intimacy and they both agreed to make compromises. Pt reports that he is looking forwards to a planned romantic evening with his partner this week. [facility] and pt discussed navigating pt's emotions if his expectations are not met and what he can do to process through those emotions in a healthy manner. Pt identifies his mother as a safe person to process through negative emotions with. Pt did report that since his last appointment, he did confide in his mother about he mental health symptoms and passive suicidal thoughts. Pt states that his mother has been checking in on him regularly  as a result.     In regards to safety, pt still endorses passive SI, but reports that his suicidal ideation is less frequent, about once a day vs 4 of 5 times a days. Pt continue to report that his thoughts are passive, fleeting in nature. Pt continues to deny having a plan or intent to end his life.     In regards to self-care, pt states that he is working on being active at work and more intentional about physical activity. Pt also reports, trying to spend time outside of his room, in the family room with his parents and brother. Pt also reports trying to spend time off of his phone as well.     [facility] offered to assist pt is scheduling follow-up on this date, but due to schedule, pt states that he will call the clinic to schedule a follow-up appointment.     Progress on Treatment Objective(s) / Homework:  Satisfactory progress - ACTION (Actively working towards change); Intervened by reinforcing change plan / affirming steps taken    Motivational Interviewing    MI Intervention: Expressed Empathy/Understanding, Supported Autonomy, Collaboration, Evocation, Permission to raise concern or advise, Open-ended questions, Reflections: simple and complex and Change talk (evoked)     Change Talk Expressed by the Patient: Desire to change Reasons to change Need to change    Provider Response to Change Talk: E - Evoked more info from patient about behavior change, A - Affirmed patient's thoughts, decisions, or attempts at behavior change, R - Reflected patient's change talk and S - Summarized patient's change talk statements      Care Plan review completed: Yes    Medication Review:  Pt saw PCP pm 11/03/22. Pt was restarted on fluoxetine.     Medication Compliance:  Pt reports that he plans to  his perscription from the pharmacy today or tomorrow., pending his schedule.     Changes in Health Issues:   None reported    Chemical Use Review:   Substance Use: Chemical use reviewed, no active concerns identified  Pt reports  that he does use delta products to assisting in going to sleep at night.      Tobacco Use: No current tobacco use.      Assessment: Current Emotional / Mental Status (status of significant symptoms):  Risk status (Self / Other harm or suicidal ideation)  Patient has had a history of suicidal ideation: pt endorses suicidal ideation that pt endorses as passive  Patient denies current fears or concerns for personal safety.  Patient reports the following current or recent suicidal ideation or behaviors: pt endorses fleeting, passive suicidal ideation. Pt states that he has started to have thoughts of putting a gun in his mouth which scares him. Pt does not have intent to act on these thoughts. .  Patient denies current or recent homicidal ideation or behaviors.  Patient denies current or recent self injurious behavior or ideation.  Patient denies other safety concerns.  A safety and risk management plan has not been developed at this time, however patient was encouraged to call [place] [phone] should there be a change in any of these risk factors. [facility] did recommend creating a safety plan at this time, but pt declined. Pt declined stating that he feels that he does not need a safety plan as he has no intention of acting on these thoughts or taking his own life. Pt states that he feels supported by his parents and states that he lives with them. [facility] and pt discussed being open with his parents and letting them know if he feels he cannot keep himself safe or if his suicidal thoughts become active. Pt agreed, pt states that if his parents suggested he go to the hospital if he was unable to keep himself safe that he would agree to be assessed. Pt denies any history of suicide attempts of psychiatric inpatient admissions. Pt reports that his father does own firearms, but pt reports that he does not know where the guns are and that his father is the only person that has access to them. Pt denies current safety concerns.      Risk is mitigated by pt's willingness to seek mental health support. Pt is agreeable to meeting with [facility] on weekly basis and next appointment has been scheduled for . [facility] also assisted pt in scheduling urgent appointment with PCP to discuss restarting psychiatric medication which pt is agreeable to.  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 Protective factors include his parents and his daughter who is due at the end of 2022. Coping skills were also discussed to include increasing physical movement and starting to paint miniature figurines again which is a hobby that pt really enjoys.     Appearance:   Appropriate   Eye Contact:   Good   Psychomotor Behavior: Normal   Attitude:   Cooperative  Interested  Orientation:   All  Speech   Rate / Production: Normal    Volume:  Normal   Mood:    Anxious   Affect:    Blunted   Thought Content:  Clear   Thought Form:  Coherent  Logical   Insight:    Good     Diagnoses:  1. HUGO (generalized anxiety disorder)    2. Moderate recurrent major depression (H)    Provisional, Hx diagnoses. DA has not yet been completed.     Collateral Reports Completed:  Not Applicable    Plan: (Homework, other):  Patient was given information about behavioral services and encouraged to schedule a follow up appointment with the clinic [facility] in 2 weeks. Pt states he will call the [facility] to schedule. Pt does have another follow-up with primary-care provider on 22.  [facility] and pt also discussed  He was also given information about mental health symptoms and treatment options .  CD Recommendations: No indications of CD issues.       ADEOLA Rivera, [facility]  Behavioral Health Clinician  [facility], [address], [address], [place], [address]  [phone]    2022

## 2022-11-19 ENCOUNTER — HEALTH MAINTENANCE LETTER (OUTPATIENT)
Age: 22
End: 2022-11-19

## 2023-07-02 ENCOUNTER — HEALTH MAINTENANCE LETTER (OUTPATIENT)
Age: 23
End: 2023-07-02

## 2024-08-25 ENCOUNTER — HEALTH MAINTENANCE LETTER (OUTPATIENT)
Age: 24
End: 2024-08-25

## (undated) DEVICE — LINEN POUCH DBL 5427

## (undated) DEVICE — ENDO TROCAR BLUNT 100MM W/THRD ANCHOR BLUNTPORT BPT12STS

## (undated) DEVICE — DRAPE MINOR PROCEDURE LAP 29496

## (undated) DEVICE — SYR EAR BULB 3OZ 0035830

## (undated) DEVICE — Device

## (undated) DEVICE — STPL ENDO GIA 12MM UNIV 030449

## (undated) DEVICE — GLOVE PROTEXIS BLUE W/NEU-THERA 7.0  2D73EB70

## (undated) DEVICE — LINEN TOWEL PACK X5 5464

## (undated) DEVICE — LINEN HALF SHEET 5512

## (undated) DEVICE — DRSG TEGADERM 4X10" 1627

## (undated) DEVICE — ESU ELEC BLADE 2.75" COATED/INSULATED E1455

## (undated) DEVICE — SU VICRYL 2-0 SH 27" J317H

## (undated) DEVICE — SUCTION IRR STRYKERFLOW II W/TIP 250-070-520

## (undated) DEVICE — GOWN IMPERVIOUS SPECIALTY XLG/XLONG 32474

## (undated) DEVICE — SOL NACL 0.9% INJ 1000ML BAG 2B1324X

## (undated) DEVICE — ENDO CANNULA 05MM VERSAONE UNIVERSAL UNVCA5STF

## (undated) DEVICE — PREP CHLORAPREP 26ML TINTED ORANGE  260815

## (undated) DEVICE — LINEN TOWEL PACK X10 5473

## (undated) DEVICE — SOL WATER IRRIG 1000ML BOTTLE 2F7114

## (undated) DEVICE — ESU CORD MONOPOLAR 10'  E0510

## (undated) DEVICE — STPL ENDO RELOAD GIA 45X2MM ROTIC 030453

## (undated) DEVICE — BAG CLEAR TRASH 1.3M 39X33" P4040C

## (undated) DEVICE — PACK MINOR SBA15MIFSE

## (undated) DEVICE — GLOVE PROTEXIS W/NEU-THERA 8.0  2D73TE80

## (undated) DEVICE — SUCTION CANISTER MEDIVAC LINER 3000ML W/LID 65651-530

## (undated) DEVICE — ESU PENCIL W/HOLSTER E2350H

## (undated) DEVICE — GLOVE PROTEXIS POWDER FREE SMT 6.5  2D72PT65X

## (undated) DEVICE — NDL 19GA 1.5"

## (undated) DEVICE — LINEN FULL SHEET 5511

## (undated) DEVICE — ESU GROUND PAD ADULT W/CORD E7507

## (undated) DEVICE — ENDO TROCAR 05MM VERSAONE BLADELESS W/STD FIX CAN NONB5STF

## (undated) DEVICE — LINEN DRAPE 54X72" 5467

## (undated) DEVICE — SUCTION TIP YANKAUER W/O VENT K86

## (undated) DEVICE — SU VICRYL 0 CT-2 CR 8X18" J727D

## (undated) DEVICE — SU PROLENE 2-0 FS 18" 8685H

## (undated) DEVICE — STPL ENDO RELOAD 45MM VASCULAR MEDIUM TAN EGIA45AVM

## (undated) DEVICE — APPLICATOR COTTON TIP 6"X2 STERILE LF 6012

## (undated) DEVICE — SU MONOCRYL 4-0 PS-2 18" UND Y496G

## (undated) DEVICE — SOL NACL 0.9% IRRIG 1000ML BOTTLE 2F7124

## (undated) DEVICE — SU VICRYL 4-0 PS-2 18" UND J496H

## (undated) DEVICE — BLADE CLIPPER 3M 9670

## (undated) DEVICE — DECANTER VIAL 2006S

## (undated) RX ORDER — NEOSTIGMINE METHYLSULFATE 1 MG/ML
VIAL (ML) INJECTION
Status: DISPENSED
Start: 2017-11-01

## (undated) RX ORDER — HYDROCODONE BITARTRATE AND ACETAMINOPHEN 5; 325 MG/1; MG/1
TABLET ORAL
Status: DISPENSED
Start: 2017-11-01

## (undated) RX ORDER — PROPOFOL 10 MG/ML
INJECTION, EMULSION INTRAVENOUS
Status: DISPENSED
Start: 2017-11-01

## (undated) RX ORDER — BUPIVACAINE HYDROCHLORIDE 2.5 MG/ML
INJECTION, SOLUTION EPIDURAL; INFILTRATION; INTRACAUDAL
Status: DISPENSED
Start: 2017-11-01

## (undated) RX ORDER — DEXAMETHASONE SODIUM PHOSPHATE 4 MG/ML
INJECTION, SOLUTION INTRA-ARTICULAR; INTRALESIONAL; INTRAMUSCULAR; INTRAVENOUS; SOFT TISSUE
Status: DISPENSED
Start: 2018-07-06

## (undated) RX ORDER — ONDANSETRON 2 MG/ML
INJECTION INTRAMUSCULAR; INTRAVENOUS
Status: DISPENSED
Start: 2017-11-01

## (undated) RX ORDER — GLYCOPYRROLATE 0.2 MG/ML
INJECTION INTRAMUSCULAR; INTRAVENOUS
Status: DISPENSED
Start: 2017-11-01

## (undated) RX ORDER — LIDOCAINE HYDROCHLORIDE 10 MG/ML
INJECTION, SOLUTION EPIDURAL; INFILTRATION; INTRACAUDAL; PERINEURAL
Status: DISPENSED
Start: 2017-11-01

## (undated) RX ORDER — FENTANYL CITRATE 50 UG/ML
INJECTION, SOLUTION INTRAMUSCULAR; INTRAVENOUS
Status: DISPENSED
Start: 2018-07-06

## (undated) RX ORDER — CEFAZOLIN SODIUM 1 G/50ML
SOLUTION INTRAVENOUS
Status: DISPENSED
Start: 2018-07-06

## (undated) RX ORDER — KETOROLAC TROMETHAMINE 30 MG/ML
INJECTION, SOLUTION INTRAMUSCULAR; INTRAVENOUS
Status: DISPENSED
Start: 2017-11-01

## (undated) RX ORDER — DEXAMETHASONE SODIUM PHOSPHATE 4 MG/ML
INJECTION, SOLUTION INTRA-ARTICULAR; INTRALESIONAL; INTRAMUSCULAR; INTRAVENOUS; SOFT TISSUE
Status: DISPENSED
Start: 2017-11-01

## (undated) RX ORDER — LIDOCAINE HYDROCHLORIDE 20 MG/ML
INJECTION, SOLUTION EPIDURAL; INFILTRATION; INTRACAUDAL; PERINEURAL
Status: DISPENSED
Start: 2018-07-06

## (undated) RX ORDER — PROPOFOL 10 MG/ML
INJECTION, EMULSION INTRAVENOUS
Status: DISPENSED
Start: 2018-07-06

## (undated) RX ORDER — FENTANYL CITRATE 50 UG/ML
INJECTION, SOLUTION INTRAMUSCULAR; INTRAVENOUS
Status: DISPENSED
Start: 2017-11-01

## (undated) RX ORDER — ONDANSETRON 2 MG/ML
INJECTION INTRAMUSCULAR; INTRAVENOUS
Status: DISPENSED
Start: 2018-07-06

## (undated) RX ORDER — ACETAMINOPHEN 10 MG/ML
INJECTION, SOLUTION INTRAVENOUS
Status: DISPENSED
Start: 2017-11-01